# Patient Record
Sex: FEMALE | URBAN - METROPOLITAN AREA
[De-identification: names, ages, dates, MRNs, and addresses within clinical notes are randomized per-mention and may not be internally consistent; named-entity substitution may affect disease eponyms.]

---

## 2018-07-19 ENCOUNTER — APPOINTMENT (RX ONLY)
Dept: URBAN - METROPOLITAN AREA CLINIC 322 | Facility: CLINIC | Age: 73
Setting detail: DERMATOLOGY
End: 2018-07-19

## 2018-07-19 DIAGNOSIS — D485 NEOPLASM OF UNCERTAIN BEHAVIOR OF SKIN: ICD-10-CM

## 2018-07-19 DIAGNOSIS — L72.8 OTHER FOLLICULAR CYSTS OF THE SKIN AND SUBCUTANEOUS TISSUE: ICD-10-CM

## 2018-07-19 PROBLEM — D48.5 NEOPLASM OF UNCERTAIN BEHAVIOR OF SKIN: Status: ACTIVE | Noted: 2018-07-19

## 2018-07-19 PROBLEM — E78.5 HYPERLIPIDEMIA, UNSPECIFIED: Status: ACTIVE | Noted: 2018-07-19

## 2018-07-19 PROCEDURE — 99213 OFFICE O/P EST LOW 20 MIN: CPT

## 2018-07-19 PROCEDURE — ? COUNSELING

## 2018-07-19 ASSESSMENT — LOCATION ZONE DERM
LOCATION ZONE: TRUNK
LOCATION ZONE: LEG

## 2018-07-19 ASSESSMENT — LOCATION DETAILED DESCRIPTION DERM
LOCATION DETAILED: RIGHT ANTERIOR PROXIMAL THIGH
LOCATION DETAILED: RIGHT SUPERIOR MEDIAL UPPER BACK

## 2018-07-19 ASSESSMENT — LOCATION SIMPLE DESCRIPTION DERM
LOCATION SIMPLE: RIGHT THIGH
LOCATION SIMPLE: RIGHT UPPER BACK

## 2018-07-19 NOTE — PROCEDURE: COUNSELING
Patient Specific Counseling (Will Not Stick From Patient To Patient): [place] [facility] [id number]\\nAtypical melanocytic proliferation with clear margins (Path states may be solar lentigo but cannot exclude MMIS)\\n\\nDiscussed with pt the nature of pathology and emphasized standard of care is re-excision with clear margins due to atypical nature and risk of MMIS.  PT states she has had lesion for 20 years and was not worried about it at all.  Pt would like to let lesion continue to heal and have re-evaluated in 3 months.  Emphasized if pt opts not to re-excise at this time then with any signs of recurrent pigment pt needs to have immediate excision to prevent further tissue destruction and possible metastasis if lesion is MMIS and margins are not clear.  Pt understands. Patient Specific Counseling (Will Not Stick From Patient To Patient): [place] [facility] [id number]\\nAtypical melanocytic proliferation with clear margins (Path states may be solar lentigo but cannot exclude MMIS)\\n\\nDiscussed with pt the nature of pathology and emphasized standard of care is re-excision with clear margins due to atypical nature and risk of MMIS.  PT states she has had lesion for 20 years and was not worried about it at all.  Pt would like to let lesion continue to heal and have re-evaluated in 3 months.  Emphasized if pt opts not to re-excise at this time then with any signs of recurrent pigment pt needs to have immediate excision to prevent further tissue destruction and possible metastasis if lesion is MMIS and margins are not clear.  Pt understands.

## 2018-07-19 NOTE — PROCEDURE: COUNSELING
Patient Specific Counseling (Will Not Stick From Patient To Patient): DIscussed treatment option including punch bx.  Pt states she will monitor at this time and may f/u for punch excision of lesion